# Patient Record
Sex: FEMALE | URBAN - METROPOLITAN AREA
[De-identification: names, ages, dates, MRNs, and addresses within clinical notes are randomized per-mention and may not be internally consistent; named-entity substitution may affect disease eponyms.]

---

## 2020-05-07 ENCOUNTER — APPOINTMENT (RX ONLY)
Dept: URBAN - METROPOLITAN AREA CLINIC 167 | Facility: CLINIC | Age: 65
Setting detail: DERMATOLOGY
End: 2020-05-07

## 2020-05-07 PROBLEM — D48.5 NEOPLASM OF UNCERTAIN BEHAVIOR OF SKIN: Status: ACTIVE | Noted: 2020-05-07

## 2020-05-07 PROCEDURE — ? OTHER

## 2020-05-07 PROCEDURE — 99202 OFFICE O/P NEW SF 15 MIN: CPT | Mod: 95

## 2020-05-07 PROCEDURE — ? TELEHEALTH ASSESSMENT

## 2020-05-07 PROCEDURE — ? COUNSELING

## 2020-05-07 PROCEDURE — ? DEFER

## 2020-05-07 NOTE — HPI: SKIN LESION
What Type Of Note Output Would You Prefer (Optional)?: Bullet Format
Is This A New Presentation, Or A Follow-Up?: Skin Lesion
Additional History: She states this started with an injury from a rock when she was using a weed whacker.  The scab never quite healed and over the years it has spread outward very slowly with scaling and scabbing appearing every so often at an edge.  A biopsy was done by a Mohs surgeon in [place] in 2017 (a 4 mm punch biopsy from the center of the lesion, which she states was about half the size at that point) and it showed dermal fibrosis and inflammation, no basal cell as the rule out stated.  Nothing further was done or advised.  She denies picking at the lesion, although states 10 years ago she did a few times

## 2020-05-07 NOTE — PROCEDURE: COUNSELING
Patient Specific Counseling (Will Not Stick From Patient To Patient): **We discussed at length the various possible diagnoses and treatment plans including XRT, which she had for a BCC on her right cheek many years ago, and Mohs surgery.
Detail Level: Detailed

## 2020-05-07 NOTE — PROCEDURE: OTHER
Detail Level: Simple
Other (Free Text): Patient is in [place] until late June early July.. will schedule appointment
Note Text (......Xxx Chief Complaint.): This diagnosis correlates with the

## 2020-07-10 PROBLEM — C44.91 BASAL CELL CARCINOMA (BCC): Status: ACTIVE | Noted: 2020-07-10

## 2020-07-23 PROBLEM — E78.5 HYPERLIPIDEMIA: Status: ACTIVE | Noted: 2020-07-23

## 2020-08-27 PROBLEM — M85.88 OSTEOPENIA OF SPINE: Status: ACTIVE | Noted: 2020-08-27

## 2023-06-24 PROBLEM — C50.011: Status: ACTIVE | Noted: 2021-01-05

## 2023-06-24 PROBLEM — E78.9 BORDERLINE HIGH CHOLESTEROL: Status: ACTIVE | Noted: 2023-06-24

## 2023-06-24 PROBLEM — R05.3 CHRONIC COUGH: Status: ACTIVE | Noted: 2023-06-24

## 2023-06-24 PROBLEM — N60.19 FIBROCYSTIC BREAST DISEASE (FCBD): Status: ACTIVE | Noted: 2023-06-24

## 2023-06-24 PROBLEM — E66.3 OVERWEIGHT: Status: ACTIVE | Noted: 2023-06-24

## 2023-06-24 PROBLEM — G51.0 BELL'S PALSY: Status: ACTIVE | Noted: 2023-06-24

## 2023-06-24 PROBLEM — Z17.0: Status: ACTIVE | Noted: 2021-01-05

## 2023-06-24 PROBLEM — M12.9 ARTHROPATHY: Status: ACTIVE | Noted: 2023-06-24

## 2023-06-24 PROBLEM — M25.559 HIP PAIN: Status: ACTIVE | Noted: 2023-06-24

## 2024-01-23 PROBLEM — I63.411 CEREBROVASCULAR ACCIDENT (CVA) DUE TO EMBOLISM OF RIGHT MIDDLE CEREBRAL ARTERY (HCC): Status: ACTIVE | Noted: 2024-01-23

## 2024-01-23 PROBLEM — I63.311 CEREBROVASCULAR ACCIDENT (CVA) DUE TO THROMBOSIS OF RIGHT MIDDLE CEREBRAL ARTERY (HCC): Status: ACTIVE | Noted: 2024-01-23

## 2024-01-23 PROBLEM — R91.1 PULMONARY NODULE, RIGHT: Status: ACTIVE | Noted: 2024-01-23

## 2024-04-19 PROBLEM — E11.9 TYPE 2 DIABETES MELLITUS WITHOUT COMPLICATION (HCC): Status: ACTIVE | Noted: 2024-04-19

## 2024-04-19 PROBLEM — I10 ESSENTIAL HYPERTENSION: Status: ACTIVE | Noted: 2024-04-19

## 2024-04-22 PROBLEM — Z86.73 HISTORY OF COMPLETED STROKE: Status: ACTIVE | Noted: 2024-04-22

## 2024-04-22 PROBLEM — I63.311 CEREBROVASCULAR ACCIDENT (CVA) DUE TO THROMBOSIS OF RIGHT MIDDLE CEREBRAL ARTERY (HCC): Status: RESOLVED | Noted: 2024-01-23 | Resolved: 2024-04-22

## 2024-05-24 PROBLEM — Z91.148 H/O MEDICATION NONCOMPLIANCE: Status: ACTIVE | Noted: 2024-05-24

## 2024-05-24 PROBLEM — J30.1 SEASONAL ALLERGIC RHINITIS DUE TO POLLEN: Status: ACTIVE | Noted: 2024-05-24

## 2024-07-04 PROBLEM — H02.36 REDUNDANT EYELID OF BOTH EYES: Status: ACTIVE | Noted: 2024-07-04

## 2024-07-04 PROBLEM — H02.33 REDUNDANT EYELID OF BOTH EYES: Status: ACTIVE | Noted: 2024-07-04

## 2024-11-04 DIAGNOSIS — Z00.6 CLINICAL TRIAL PARTICIPANT: ICD-10-CM
